# Patient Record
Sex: FEMALE | Race: WHITE | URBAN - METROPOLITAN AREA
[De-identification: names, ages, dates, MRNs, and addresses within clinical notes are randomized per-mention and may not be internally consistent; named-entity substitution may affect disease eponyms.]

---

## 2024-06-30 ENCOUNTER — APPOINTMENT (OUTPATIENT)
Dept: GENERAL RADIOLOGY | Age: 49
End: 2024-06-30

## 2024-06-30 ENCOUNTER — HOSPITAL ENCOUNTER (EMERGENCY)
Age: 49
Discharge: HOME | End: 2024-06-30
Attending: EMERGENCY MEDICINE

## 2024-06-30 VITALS
RESPIRATION RATE: 18 BRPM | BODY MASS INDEX: 15.79 KG/M2 | OXYGEN SATURATION: 99 % | DIASTOLIC BLOOD PRESSURE: 101 MMHG | WEIGHT: 104.2 LBS | HEART RATE: 90 BPM | TEMPERATURE: 98.7 F | HEIGHT: 68 IN | SYSTOLIC BLOOD PRESSURE: 141 MMHG

## 2024-06-30 DIAGNOSIS — S61.412A LACERATION OF LEFT HAND WITHOUT FOREIGN BODY, INITIAL ENCOUNTER: Primary | ICD-10-CM

## 2024-06-30 PROCEDURE — 90715 TDAP VACCINE 7 YRS/> IM: CPT

## 2024-06-30 PROCEDURE — 99284 EMERGENCY DEPT VISIT MOD MDM: CPT

## 2024-06-30 PROCEDURE — 2500000003 HC RX 250 WO HCPCS

## 2024-06-30 PROCEDURE — 73130 X-RAY EXAM OF HAND: CPT

## 2024-06-30 PROCEDURE — 12001 RPR S/N/AX/GEN/TRNK 2.5CM/<: CPT

## 2024-06-30 PROCEDURE — 90471 IMMUNIZATION ADMIN: CPT

## 2024-06-30 PROCEDURE — 6360000002 HC RX W HCPCS

## 2024-06-30 RX ORDER — LIDOCAINE HYDROCHLORIDE AND EPINEPHRINE 10; 10 MG/ML; UG/ML
20 INJECTION, SOLUTION INFILTRATION; PERINEURAL ONCE
Status: COMPLETED | OUTPATIENT
Start: 2024-06-30 | End: 2024-06-30

## 2024-06-30 RX ADMIN — LIDOCAINE HYDROCHLORIDE,EPINEPHRINE BITARTRATE 20 ML: 10; .01 INJECTION, SOLUTION INFILTRATION; PERINEURAL at 19:50

## 2024-06-30 RX ADMIN — TETANUS TOXOID, REDUCED DIPHTHERIA TOXOID AND ACELLULAR PERTUSSIS VACCINE, ADSORBED 0.5 ML: 5; 2.5; 8; 8; 2.5 SUSPENSION INTRAMUSCULAR at 19:14

## 2024-06-30 ASSESSMENT — PAIN - FUNCTIONAL ASSESSMENT: PAIN_FUNCTIONAL_ASSESSMENT: NONE - DENIES PAIN
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
## 2024-06-30 NOTE — ED NOTES
Patient prepared for and ready to be discharged. Patient discharged at this time in no acute distress after verbalizing understanding of discharge instructions. Patient left after receiving After Visit Summary instructions.       Chivo Schrader RN  06/30/24 [id number]

## 2024-06-30 NOTE — DISCHARGE INSTRUCTIONS
You were seen in the Emergency Department today for a laceration. You received a total of 2 stitches/staples. Your sutures are absorbable and do not need to be removed.    Keep the wound dry for the first 12-24 hours. After that, you can clean the area with a mild soap and water 2 times a day. Don’t use hydrogen peroxide, iodine-based solutions, or alcohol, which can slow healing, and will probably be painful.    Please follow-up with your primary care doctor within 1 week for recheck of your wound/laceration.  Please return immediately to the emergency department for reevaluation if:  You have very bad swelling around the wound.  Your pain suddenly gets worse and is very bad.  You notice painful lumps near the wound or on skin that is anywhere on your body.  You have a red streak going away from your wound.  The wound is on your hand or foot and you cannot move a finger or toe like you usually can.  The wound is on your hand or foot and you notice that your fingers or toes look pale or bluish